# Patient Record
Sex: MALE | Race: OTHER | NOT HISPANIC OR LATINO | URBAN - METROPOLITAN AREA
[De-identification: names, ages, dates, MRNs, and addresses within clinical notes are randomized per-mention and may not be internally consistent; named-entity substitution may affect disease eponyms.]

---

## 2019-11-14 ENCOUNTER — EMERGENCY (EMERGENCY)
Facility: HOSPITAL | Age: 44
LOS: 1 days | Discharge: HOME | End: 2019-11-14
Attending: EMERGENCY MEDICINE | Admitting: EMERGENCY MEDICINE
Payer: OTHER MISCELLANEOUS

## 2019-11-14 VITALS
RESPIRATION RATE: 16 BRPM | OXYGEN SATURATION: 95 % | WEIGHT: 199.96 LBS | DIASTOLIC BLOOD PRESSURE: 90 MMHG | HEART RATE: 95 BPM | SYSTOLIC BLOOD PRESSURE: 131 MMHG | TEMPERATURE: 98 F | HEIGHT: 65 IN

## 2019-11-14 PROCEDURE — 99284 EMERGENCY DEPT VISIT MOD MDM: CPT

## 2019-11-14 PROCEDURE — 73060 X-RAY EXAM OF HUMERUS: CPT | Mod: 26,LT

## 2019-11-14 PROCEDURE — 99284 EMERGENCY DEPT VISIT MOD MDM: CPT | Mod: 25

## 2019-11-14 PROCEDURE — 76882 US LMTD JT/FCL EVL NVASC XTR: CPT

## 2019-11-14 PROCEDURE — 73060 X-RAY EXAM OF HUMERUS: CPT

## 2019-11-14 PROCEDURE — 73060 X-RAY EXAM OF HUMERUS: CPT | Mod: 26

## 2019-11-14 RX ORDER — IBUPROFEN 200 MG
600 TABLET ORAL ONCE
Refills: 0 | Status: COMPLETED | OUTPATIENT
Start: 2019-11-14 | End: 2019-11-14

## 2019-11-14 RX ADMIN — Medication 600 MILLIGRAM(S): at 14:48

## 2019-11-14 NOTE — ED PROVIDER NOTE - PATIENT PORTAL LINK FT
You can access the FollowMyHealth Patient Portal offered by [facility] by registering at the following website: [email or web address]. By joining [employer]’s FollowMyHealth portal, you will also be able to view your health information using other applications (apps) compatible with our system.

## 2019-11-14 NOTE — ED PROVIDER NOTE - NSFOLLOWUPCLINICS_GEN_ALL_ED_FT
Workers' Compensation Board  Workers' Compensation  [facility]. [address] [place] [address], [address] [place] [address]  Phone: [phone]  Fax:   Follow Up Time: 1-3 Days

## 2019-11-14 NOTE — ED PROVIDER NOTE - NSFOLLOWUPINSTRUCTIONS_ED_ALL_ED_FT
Rotura del tendón proximal del bíceps  Proximal Biceps Tendon Disruption  Image   El tendón proximal del bíceps es un cordón resistente de tejido que conecta el músculo del bíceps —que es el músculo que se encuentra en la parte delantera superior del brazo— al omóplato. [place] rotura del tendón proximal del bíceps puede implicar un desgarro parcial o completo del tendón cerca de donde se conecta con el hueso del hombro.   Esta lesión puede interferir en bruner capacidad de levantar el brazo chago de bruner cuerpo, estabilizar el hombro, flexionar el codo y girar la gomes de la mano hacia arriba (supinación).  ¿Cuáles son las causas?  Esta afección ocurre cuando se ejerce demasiada fuerza sobre el tendón. Esta fuerza extra puede ser causada por lo siguiente:  Extender el codo súbitamente de fallon posición flexionada debido a fallon fuerza externa. [place] puede ocurrir, por ejemplo, al agarrar un objeto pesado o a causa del arrastre del esquí acuático.Desgaste normal a causa de la actividad física.Detener fallon caída con la mano.¿Qué incrementa el riesgo?  Los siguientes factores pueden hacer que sea más propenso a contraer esta afección:  Practicar deportes de contacto.Hacer actividades o practicar deportes que involucren lanzamientos o movimientos por encima del hombro, anna deportes de raqueta, gimnasia o béisbol.Hacer actividades o practicar deportes que impliquen ejercer fallon fuerza súbita en el brazo, anna levantamiento de pesas o esquí acuático.Tener un tendón debilitado debido a alguna de las siguientes causas:  Tendinitis prolongada en el bíceps (crónica).Ciertas afecciones médicas, tales anna la diabetes o la artritis reumatoide.Uso reiterado de corticoesteroides.Movimientos reiterados por encima del hombro.¿Cuáles son los signos o los síntomas?  Los síntomas de esta afección pueden incluir los siguientes:  Dolor intenso y súbito en la parte de adelante del hombro. El dolor puede empeorar con ciertos movimientos, por ejemplo:  Levantar o cargar objetos.Estirar el codo.Lanzar objetos o realizar movimientos por encima del hombro.Inflamación o sensación de calor inusual en la parte de adelante del hombro.Contracción dolorosa (espasmo) del músculo del bíceps.Un bulto en la anisa interna de la parte superior del brazo cuando el codo está flexionado.[place] en el hombro o en la parte superior del brazo. Estas pueden aparecer entre 24 y 48 horas después de que el tendón se lesione.Amplitud de movimientos limitada del hombro y el codo.Debilidad en el codo y el antebrazo al realizar los siguientes movimientos:  Flexionar el codo.Girar la arsh.¿Cómo se diagnostica?  Esta afección se puede diagnosticar en función de lo siguiente:  Los síntomas y los antecedentes médicos.Un examen físico. Es posible que el médico verifique la fuerza y la amplitud de movimientos del hombro y el codo.Pruebas de diagnóstico por imágenes, por ejemplo:  Radiografías.[place] resonancia magnética (RM).[place] ecografía.¿Cómo se trata?  El tratamiento depende de la gravedad de la afección. El tratamiento puede incluir:  Medicamentos que ayudan a aliviar el dolor y la inflamación.Reposo y aplicación de hielo en la anisa lesionada.Evitar ciertas actividades que ejerzan presión en el hombro.Realizar fisioterapia.Someterse a cirugía para reparar el desgarro. [place] puede ser necesario si bruner afección no mejora con los tratamientos no quirúrgicos.Aplicarse fallon o más inyecciones de medicamentos (corticoesteroides) en la parte superior del brazo para reducir la inflamación. [place] tratamiento es poco frecuente.Siga estas indicaciones en bruner casa:  Control del dolor, la rigidez y la hinchazón     Image       Image   Si se lo indican, aplique hielo sobre la anisa de la lesión:  Ponga el hielo en fallon bolsa plástica.Coloque fallon toalla entre la piel y la bolsa.Coloque el hielo dash 20 minutos, 2 a 3 veces por día.Si se lo indican, aplique calor en la anisa afectada antes de hacer ejercicios o tan frecuentemente anna se lo haya indicado el médico. Use la bettie de calor que el médico le recomiende, anna fallon compresa de calor húmedo o fallon almohadilla térmica.  Coloque fallon toalla entre la piel y la bettie de calor.Aplique calor dash 20 a 30 minutos.Retire la bettie de calor si la piel se pone de color bustamante brillante. [place] es especialmente importante si no puede sentir dolor, calor o frío. Puede correr un riesgo mayor de sufrir quemaduras.Mueva los dedos con frecuencia para evitar la rigidez y disminuir la hinchazón.Levante (eleve) la anisa lesionada mientras esté sentado o acostado.Actividad     Retome kamlesh actividades normales anna se lo haya indicado el médico. Pregúntele al médico qué actividades son seguras para usted.Evite las actividades que le causen dolor o empeoren la afección.No levante ningún objeto que pese más de 10 libras (4,5 kg) o el límite de peso que le hayan indicado, hasta que el médico le diga que puede hacerlo.Law los ejercicios anna se lo haya indicado el fisioterapeuta o el médico.Indicaciones generales     [place] los medicamentos de venta reji y los recetados solamente anna se lo haya indicado el médico.No consuma ningún producto que contenga nicotina o tabaco, anna cigarrillos y cigarrillos electrónicos. Si necesita ayuda para dejar de fumar, consulte al médico.Concurra a todas las visitas de seguimiento anna se lo haya indicado el médico. [place] es importante.¿Cómo se trey?  Siga estos pasos para evitar fallon nueva lesión:  Precaliente y elongue adecuadamente antes de hacer actividad física.Relájese y elongue después de hacer actividad física.Ethan al cuerpo tiempo para descansar entre los períodos de actividad física.Asegúrese de usar el equipo que sea apto para usted.[place] medidas de seguridad y sea responsable al hacer actividad física. [place] ayudará a evitar caídas.Mantenga un buen estado físico, lo que incluye la flexibilidad y la fuerza.Comuníquese con un médico si:  Los síntomas empeoran o no mejoran después de 2 semanas de tratamiento.Tiene nuevos síntomas.Solicite ayuda inmediatamente si:  Siente dolor intenso.Siente dolor o adormecimiento en la mano.Siente un frío inusual en la mano.Las uñas se tornan de color oscuro, anna sammie o harish.Resumen  [place] rotura del tendón proximal del bíceps puede implicar un desgarro parcial o completo del tendón cerca de donde se conecta con el hueso del hombro.Esta afección ocurre cuando se ejerce demasiada fuerza sobre el tendón.El tratamiento puede incluir reposo y la aplicación de hielo en la anisa lesionada.Evite las actividades que le causen dolor o empeoren la afección.Esta información no tiene anna fin reemplazar el consejo del médico. Asegúrese de hacerle al médico cualquier pregunta que tenga.

## 2019-11-14 NOTE — ED PROVIDER NOTE - CLINICAL SUMMARY MEDICAL DECISION MAKING FREE TEXT BOX
42 y/o M  presenting with L bicep injury that occurred while lifting heavy rock 1H PTA while at work. Obvious defect at bicep muscle origin site present. Concern for bicep tendon rupture. Compartments soft, no evidence of compartment syndrome. Normal neurovascular exam. Plan X-ray, NSAIDs. sling, and likely will need ortho followup for definitive surgery.

## 2019-11-14 NOTE — ED PROVIDER NOTE - OBJECTIVE STATEMENT
42 y/o M German speaking  with no PMHx presents to the ED with L bicep pain and deformation s/p lifting heavy rock at work, which he could not hold and felt pain at top of right bicep. Currently pain with tenderness and cannot fully extend arm.  No tingling, weakness, numbness, or any other acute complaints.

## 2019-11-14 NOTE — ED PROVIDER NOTE - CARE PROVIDER_API CALL
Marlon Genao)  Orthopaedic Surgery  [address] [place] [address]  Phone: [phone]  Fax: [phone]  Follow Up Time: 4-6 Days

## 2019-11-14 NOTE — ED ADULT NURSE NOTE - OBJECTIVE STATEMENT
Patient c/o of left arm pain, states was lowering down a huge rock at work when the rock fell and landed on his hand, the weight pulling on his shoulder.  Arrives w/ left arm/shoulder pain, sling in place, unable to raise right arm to chest level due to pain, swelling to left shoulder and arm.
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
## 2019-11-19 DIAGNOSIS — S46.212A STRAIN OF MUSCLE, FASCIA AND TENDON OF OTHER PARTS OF BICEPS, LEFT ARM, INITIAL ENCOUNTER: ICD-10-CM
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
## 2019-11-19 DIAGNOSIS — M79.602 PAIN IN LEFT ARM: ICD-10-CM
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
## 2019-11-20 PROBLEM — Z78.9 OTHER SPECIFIED HEALTH STATUS: Chronic | Status: ACTIVE | Noted: 2019-11-14

## 2020-01-28 ENCOUNTER — OUTPATIENT (OUTPATIENT)
Dept: OUTPATIENT SERVICES | Facility: HOSPITAL | Age: 45
LOS: 1 days | Discharge: HOME | End: 2020-01-28

## 2025-05-04 NOTE — ED PROVIDER NOTE - CHIEF COMPLAINT
The patient is a 43y Male complaining of arm pain/injury. 0 = understands/communicates without difficulty

## 2025-06-13 NOTE — ED ADULT NURSE NOTE - NS ED NURSE LEVEL OF CONSCIOUSNESS AFFECT
Anxious [Well Nourished] : well nourished [Well Developed] : well developed [Alert] : ~L alert [Active] : active [Normal Breathing Pattern] : normal breathing pattern [No Respiratory Distress] : no respiratory distress [No Allergic Shiners] : no allergic shiners [No Drainage] : no drainage [No Conjunctivitis] : no conjunctivitis [Tympanic Membranes Clear] : tympanic membranes were clear [Nasal Mucosa Non-Edematous] : nasal mucosa non-edematous [No Nasal Drainage] : no nasal drainage [No Polyps] : no polyps [No Sinus Tenderness] : no sinus tenderness [No Oral Pallor] : no oral pallor [No Oral Cyanosis] : no oral cyanosis [Non-Erythematous] : non-erythematous [No Exudates] : no exudates [No Postnasal Drip] : no postnasal drip [No Tonsillar Enlargement] : no tonsillar enlargement [Absence Of Retractions] : absence of retractions [Symmetric] : symmetric [Good Expansion] : good expansion [No Acc Muscle Use] : no accessory muscle use [Good aeration to bases] : good aeration to bases [Equal Breath Sounds] : equal breath sounds bilaterally [No Crackles] : no crackles [No Rhonchi] : no rhonchi [No Wheezing] : no wheezing [Normal Sinus Rhythm] : normal sinus rhythm [No Heart Murmur] : no heart murmur [Soft, Non-Tender] : soft, non-tender [No Hepatosplenomegaly] : no hepatosplenomegaly [Non Distended] : was not ~L distended [Abdomen Mass (___ Cm)] : no abdominal mass palpated [Full ROM] : full range of motion [No Clubbing] : no clubbing [Capillary Refill < 2 secs] : capillary refill less than two seconds [No Cyanosis] : no cyanosis [No Petechiae] : no petechiae [No Kyphoscoliosis] : no kyphoscoliosis [No Contractures] : no contractures [Alert and  Oriented] : alert and oriented [No Abnormal Focal Findings] : no abnormal focal findings [Normal Muscle Tone And Reflexes] : normal muscle tone and reflexes [No Birth Marks] : no birth marks [No Rashes] : no rashes [No Skin Lesions] : no skin lesions [FreeTextEntry4] : +rhinorrhea [FreeTextEntry7] : +mild reduction of breath sounds throughout